# Patient Record
(demographics unavailable — no encounter records)

---

## 2025-01-28 NOTE — ASSESSMENT
[FreeTextEntry1] : This is a 46 year -old  M who was examined today for an annual preventative physical.  A complete history and physical examination were performed.  The patient seems to follow a healthy lifestyle in that he  follows a good diet and exercises regularly.    Physical examination was entirely within normal limits , including a normal blood pressure and pulse.    The following recommendations were made: Exercise regularly. Maintain a healthy diet. _____________________________________________________

## 2025-01-28 NOTE — HEALTH RISK ASSESSMENT
[Good] : ~his/her~  mood as  good [No] : No [No falls in past year] : Patient reported no falls in the past year [0] : 2) Feeling down, depressed, or hopeless: Not at all (0) [PHQ-2 Negative - No further assessment needed] : PHQ-2 Negative - No further assessment needed [Never] : Never [[id number]] : 0

## 2025-01-28 NOTE — HISTORY OF PRESENT ILLNESS
[de-identified] : This is annual Preventative examination for this 46 year  old male.  The patient has been generally feeling well with no new complaints  A complete evaluation of their current medication was reviewed with them including OTC medication .  Chronic medical problems for which they are being followed by a physician are:       Exercises regularly:  A complete Review of Systems is below but it is noteworthy to mention that this patient denies Chest Pain, Dyspnea on Exertion, Palpitations, urinary problems, rectal bleeding or Gastrointestinal problems at this time.

## 2025-02-26 NOTE — ASSESSMENT
[FreeTextEntry1] : 1- sl rise in PSA - may be within normal - 0.9 to 1.4 ( change of 0.5)-- REPEAT PSA TODAY  2- UROFLOW : MF 10 ml /sec in 26 sec and voided all 140 ml - pvr zero 3- cont flomax as needed

## 2025-02-26 NOTE — PHYSICAL EXAM
[Normal Appearance] : normal appearance [Well Groomed] : well groomed [General Appearance - In No Acute Distress] : no acute distress [Edema] : no peripheral edema [Respiration, Rhythm And Depth] : normal respiratory rhythm and effort [Exaggerated Use Of Accessory Muscles For Inspiration] : no accessory muscle use [Abdomen Soft] : soft [Abdomen Tenderness] : non-tender [Costovertebral Angle Tenderness] : no ~M costovertebral angle tenderness [Normal Station and Gait] : the gait and station were normal for the patient's age [] : no rash [No Focal Deficits] : no focal deficits [Oriented To Time, Place, And Person] : oriented to person, place, and time [Affect] : the affect was normal [Mood] : the mood was normal [No Palpable Adenopathy] : no palpable adenopathy [de-identified] : due to void 150 ml

## 2025-02-26 NOTE — HISTORY OF PRESENT ILLNESS
[FreeTextEntry1] : Jan 17, 2024: patient here for f/u of LUTS after starting flomax better wih meds not perfect some ed issues -- flow better , sl hesitacny after holdng for some time such as long car ride , feels empty afte void  previously c/o: flow sl weak, hesitancy , feels empty after void- pushes at end intermittent flow hx of large prostate base on dr De Jesus exam - 75 g 1- cont flomax 2- consder Proscar if prostate vol > 40 ml 3- recommend Bladder sono as requested before 4- discussed ED issues wiht proscar 5- dsicussed UDS if luts despite meds - for possible surgery.  BUS received pvr- 20 ml prostate vol 28 ml no need for proscar message left -- if luts cont a bother -proceed wiht UDS for possible MIST.  LABS : Jan 2025: PSA = 1.43 with neg UA              Jan 2024: PSA = 0.86             Jan 2023: PSA  = 0.47  Here for f/u on Flomax daily , on no prostate OTC  no FH of pca  off  meds for one week and did not notice much of difference hesitancy and flow improved wht flomax and not much diff  clear urine , no dyusuria, bowel habits good , weight stable

## 2025-06-19 NOTE — PLAN
[FreeTextEntry1] : 47 year old male with umbilical hernia and diastasis recti Given these findings, Dr. Sultana offered patient open umbilical hernia repair, possible mesh. We also discussed "watchful waiting" approach, but patient would like to proceed with surgery.  Risks including bleeding, infection, recurrence, etc explained to patient who verbalized understanding and would like to proceed with proposed procedure. In the interim, should patient develop pain, obstructive symptoms, overlying skin changes, etc he should go to the ED. All of patient's questions answered to his apparent satisfaction. OR planning

## 2025-06-19 NOTE — HISTORY OF PRESENT ILLNESS
[de-identified] : Mr. Desai is a 47 year old male who presents today c/o upper midline abdominal bulge for several months. Reports some mild pressure when bending down, but otherwise denies pain. Tolerates PO. No GI complaints. No fever/chills.

## 2025-06-19 NOTE — REVIEW OF SYSTEMS
[Fever] : no fever [Chills] : no chills [Feeling Poorly] : not feeling poorly [Feeling Tired] : not feeling tired [Shortness Of Breath] : no shortness of breath [Cough] : no cough [As Noted in HPI] : as noted in HPI

## 2025-06-19 NOTE — PHYSICAL EXAM
[Calm] : calm [de-identified] : A&Ox3, NAD [de-identified] : Respirations nonlabored  [de-identified] : Soft, NTND. Upper midline diastasis rectus. +soft umbilical hernia, no overlying skin changes